# Patient Record
Sex: MALE | Race: ASIAN | NOT HISPANIC OR LATINO | Employment: FULL TIME | URBAN - METROPOLITAN AREA
[De-identification: names, ages, dates, MRNs, and addresses within clinical notes are randomized per-mention and may not be internally consistent; named-entity substitution may affect disease eponyms.]

---

## 2020-08-31 ENCOUNTER — OFFICE VISIT - HEALTHEAST (OUTPATIENT)
Dept: FAMILY MEDICINE | Facility: CLINIC | Age: 35
End: 2020-08-31

## 2020-08-31 DIAGNOSIS — J30.89 SEASONAL ALLERGIC RHINITIS DUE TO OTHER ALLERGIC TRIGGER: ICD-10-CM

## 2020-08-31 DIAGNOSIS — G56.03 BILATERAL CARPAL TUNNEL SYNDROME: ICD-10-CM

## 2020-08-31 ASSESSMENT — MIFFLIN-ST. JEOR: SCORE: 1748.65

## 2020-12-07 ENCOUNTER — OFFICE VISIT - HEALTHEAST (OUTPATIENT)
Dept: FAMILY MEDICINE | Facility: CLINIC | Age: 35
End: 2020-12-07

## 2020-12-07 DIAGNOSIS — M25.531 PAIN IN BOTH WRISTS: ICD-10-CM

## 2020-12-07 DIAGNOSIS — R20.2 PARESTHESIA: ICD-10-CM

## 2020-12-07 DIAGNOSIS — J30.89 SEASONAL ALLERGIC RHINITIS DUE TO OTHER ALLERGIC TRIGGER: ICD-10-CM

## 2020-12-07 DIAGNOSIS — M25.632 WRIST STIFFNESS, LEFT: ICD-10-CM

## 2020-12-07 DIAGNOSIS — M25.532 PAIN IN BOTH WRISTS: ICD-10-CM

## 2020-12-07 DIAGNOSIS — M25.631 WRIST STIFFNESS, RIGHT: ICD-10-CM

## 2020-12-08 ENCOUNTER — OFFICE VISIT - HEALTHEAST (OUTPATIENT)
Dept: FAMILY MEDICINE | Facility: CLINIC | Age: 35
End: 2020-12-08

## 2020-12-08 ENCOUNTER — AMBULATORY - HEALTHEAST (OUTPATIENT)
Dept: LAB | Facility: CLINIC | Age: 35
End: 2020-12-08

## 2020-12-08 DIAGNOSIS — M25.531 PAIN IN BOTH WRISTS: ICD-10-CM

## 2020-12-08 DIAGNOSIS — Z13.1 SCREENING FOR DIABETES MELLITUS: ICD-10-CM

## 2020-12-08 DIAGNOSIS — M25.532 PAIN IN BOTH WRISTS: ICD-10-CM

## 2020-12-08 DIAGNOSIS — Z13.220 LIPID SCREENING: ICD-10-CM

## 2020-12-08 DIAGNOSIS — M25.631 WRIST STIFFNESS, RIGHT: ICD-10-CM

## 2020-12-08 DIAGNOSIS — Z83.3 FAMILY HISTORY OF DIABETES MELLITUS: ICD-10-CM

## 2020-12-08 DIAGNOSIS — M25.632 WRIST STIFFNESS, LEFT: ICD-10-CM

## 2020-12-08 DIAGNOSIS — R20.2 PARESTHESIA: ICD-10-CM

## 2020-12-08 LAB
ANION GAP SERPL CALCULATED.3IONS-SCNC: 14 MMOL/L (ref 5–18)
BUN SERPL-MCNC: 13 MG/DL (ref 8–22)
C REACTIVE PROTEIN LHE: <0.1 MG/DL (ref 0–0.8)
CALCIUM SERPL-MCNC: 9.4 MG/DL (ref 8.5–10.5)
CHLORIDE BLD-SCNC: 104 MMOL/L (ref 98–107)
CHOLEST SERPL-MCNC: 160 MG/DL
CO2 SERPL-SCNC: 23 MMOL/L (ref 22–31)
CREAT SERPL-MCNC: 0.88 MG/DL (ref 0.7–1.3)
ERYTHROCYTE [DISTWIDTH] IN BLOOD BY AUTOMATED COUNT: 10.7 % (ref 11–14.5)
ERYTHROCYTE [SEDIMENTATION RATE] IN BLOOD BY WESTERGREN METHOD: 2 MM/HR (ref 0–15)
FASTING STATUS PATIENT QL REPORTED: NO
GFR SERPL CREATININE-BSD FRML MDRD: >60 ML/MIN/1.73M2
GLUCOSE BLD-MCNC: 101 MG/DL (ref 70–125)
HBA1C MFR BLD: 5 %
HCT VFR BLD AUTO: 47.1 % (ref 40–54)
HDLC SERPL-MCNC: 55 MG/DL
HGB BLD-MCNC: 16.1 G/DL (ref 14–18)
LDLC SERPL CALC-MCNC: 48 MG/DL
MCH RBC QN AUTO: 29.7 PG (ref 27–34)
MCHC RBC AUTO-ENTMCNC: 34.2 G/DL (ref 32–36)
MCV RBC AUTO: 87 FL (ref 80–100)
PLATELET # BLD AUTO: 272 THOU/UL (ref 140–440)
PMV BLD AUTO: 7.3 FL (ref 7–10)
POTASSIUM BLD-SCNC: 4.3 MMOL/L (ref 3.5–5)
RBC # BLD AUTO: 5.42 MILL/UL (ref 4.4–6.2)
SODIUM SERPL-SCNC: 141 MMOL/L (ref 136–145)
TRIGL SERPL-MCNC: 286 MG/DL
TSH SERPL DL<=0.005 MIU/L-ACNC: 2.29 UIU/ML (ref 0.3–5)
WBC: 4.9 THOU/UL (ref 4–11)

## 2020-12-08 ASSESSMENT — PATIENT HEALTH QUESTIONNAIRE - PHQ9: SUM OF ALL RESPONSES TO PHQ QUESTIONS 1-9: 0

## 2020-12-08 ASSESSMENT — MIFFLIN-ST. JEOR: SCORE: 1744.68

## 2020-12-10 ENCOUNTER — COMMUNICATION - HEALTHEAST (OUTPATIENT)
Dept: FAMILY MEDICINE | Facility: CLINIC | Age: 35
End: 2020-12-10

## 2020-12-10 ENCOUNTER — AMBULATORY - HEALTHEAST (OUTPATIENT)
Dept: PEDIATRICS | Facility: CLINIC | Age: 35
End: 2020-12-10

## 2020-12-10 ENCOUNTER — COMMUNICATION - HEALTHEAST (OUTPATIENT)
Dept: PEDIATRICS | Facility: CLINIC | Age: 35
End: 2020-12-10
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
## 2020-12-10 DIAGNOSIS — M25.531 PAIN IN BOTH WRISTS: ICD-10-CM

## 2020-12-10 DIAGNOSIS — M25.532 PAIN IN BOTH WRISTS: ICD-10-CM

## 2020-12-10 LAB — ANA SER QL: 0.4 U

## 2020-12-21 ENCOUNTER — OFFICE VISIT - HEALTHEAST (OUTPATIENT)
Dept: OCCUPATIONAL THERAPY | Facility: REHABILITATION | Age: 35
End: 2020-12-21

## 2020-12-21 DIAGNOSIS — M25.641 STIFFNESS OF FINGER JOINT, RIGHT: ICD-10-CM

## 2020-12-21 DIAGNOSIS — M25.531 PAIN IN BOTH WRISTS: ICD-10-CM

## 2020-12-21 DIAGNOSIS — Z78.9 DECREASED ACTIVITIES OF DAILY LIVING (ADL): ICD-10-CM

## 2020-12-21 DIAGNOSIS — M25.532 PAIN IN BOTH WRISTS: ICD-10-CM

## 2020-12-21 DIAGNOSIS — M25.642 FINGER STIFFNESS, LEFT: ICD-10-CM

## 2021-01-04 ENCOUNTER — OFFICE VISIT - HEALTHEAST (OUTPATIENT)
Dept: OCCUPATIONAL THERAPY | Facility: REHABILITATION | Age: 36
End: 2021-01-04

## 2021-01-04 DIAGNOSIS — M25.531 PAIN IN BOTH WRISTS: ICD-10-CM

## 2021-01-04 DIAGNOSIS — M25.641 STIFFNESS OF FINGER JOINT, RIGHT: ICD-10-CM

## 2021-01-04 DIAGNOSIS — Z78.9 DECREASED ACTIVITIES OF DAILY LIVING (ADL): ICD-10-CM

## 2021-01-04 DIAGNOSIS — M25.532 PAIN IN BOTH WRISTS: ICD-10-CM

## 2021-01-04 DIAGNOSIS — M25.642 FINGER STIFFNESS, LEFT: ICD-10-CM

## 2021-05-26 ASSESSMENT — PATIENT HEALTH QUESTIONNAIRE - PHQ9: SUM OF ALL RESPONSES TO PHQ QUESTIONS 1-9: 0

## 2021-06-04 VITALS
WEIGHT: 171 LBS | BODY MASS INDEX: 23.16 KG/M2 | OXYGEN SATURATION: 98 % | HEART RATE: 74 BPM | HEIGHT: 72 IN | SYSTOLIC BLOOD PRESSURE: 130 MMHG | DIASTOLIC BLOOD PRESSURE: 86 MMHG

## 2021-06-05 VITALS
BODY MASS INDEX: 23.73 KG/M2 | HEART RATE: 69 BPM | OXYGEN SATURATION: 98 % | SYSTOLIC BLOOD PRESSURE: 120 MMHG | WEIGHT: 175 LBS | DIASTOLIC BLOOD PRESSURE: 70 MMHG

## 2021-06-05 VITALS
OXYGEN SATURATION: 98 % | HEIGHT: 72 IN | HEART RATE: 76 BPM | SYSTOLIC BLOOD PRESSURE: 128 MMHG | DIASTOLIC BLOOD PRESSURE: 84 MMHG | TEMPERATURE: 98 F | WEIGHT: 171 LBS | BODY MASS INDEX: 23.16 KG/M2

## 2021-06-11 NOTE — PROGRESS NOTES
Assessment/Plan:        1. Bilateral carpal tunnel syndrome  Exam findings were discussed   Plan:   Wrist bracing for up to 4-6 weeks.   Close follow up if no significant change or improvement as anticipated.        2. Seasonal allergic rhinitis due to other allergic trigger  Refill   - fluticasone propionate (FLONASE) 50 mcg/actuation nasal spray; 2 sprays into each nostril daily.  Dispense: 16 g; Refill: 5        At the conclusion of the encounter the plan of care, disposition and all questions were answered and reviewed, and the patient acknowledged understanding and was involved in the decision making regarding the overall care plan.     Patient Care Team:  Provider, No Primary Care as PCP - General          Subjective:    Patient ID:   Ariella Sheridan is a 34 y.o. male presenting with intermittent numbness of the hands which progressively worsens throughout the day and better in the morning, on going for the past few months. He notes to having pain on the radial side of the wrist by bending the wrist and his hand/ fingers feel tight on making a fist. He notes that the right hand seems to be worse than the left and stretching or laying the hand flat on the palm side makes it feel better.        He also needs a refill of his nasal spray used for the management of seasonal allergies.     Review of Systems  Allergy: reviewed  General : negative  A complete 5 point review of systems was obtained and is negative other than what is stated in the HPI.      The following patient's history were reviewed and updated as appropriate:   He  has no past medical history on file..      Outpatient Encounter Medications as of 8/31/2020   Medication Sig Dispense Refill     fluticasone propionate (FLONASE) 50 mcg/actuation nasal spray 1-2 sprays.       No facility-administered encounter medications on file as of 8/31/2020.          Objective:   /86 (Patient Site: Right Arm, Patient Position: Sitting, Cuff Size: Adult Regular)    Pulse 74   Ht 6' (1.829 m)   Wt 171 lb (77.6 kg)   SpO2 98%   BMI 23.19 kg/m        Physical Exam  Gen: NAD, well hydrated   Hands/wrists: normal to inspection no swelling or erythema, subjective numbness of the hands, normal strength. Normal muscle bulk and tone.

## 2021-06-13 NOTE — TELEPHONE ENCOUNTER
----- Message from Nicci Beckett DO sent at 12/10/2020 12:23 PM CST -----  Please call patient and let him know. Diabetes screen negative. His lipid profile showed slightly elevated triglycerides ( fatty cells in body) - doesn't require meds at this time. Most likely diet related. Would increase fruits/veggies and cut out fatty food. Will recheck next year.

## 2021-06-13 NOTE — PROGRESS NOTES
Progress Note     ASSESSMENT/PLAN:    Pain in both wrists  Wrist stiffness, right  Wrist stiffness, left  Paresthesia  Given symmetric nature of the wrist pain, non-median nerve distribution of paresthesias, no improvement with wrist brace, improvement with NSAIDs,  (+) FMHX of rheumatoid dz and reproducible bony tenderness - will get baseline work up for inflammatory condition. Will also get wrist Xrays due to worsening pain and duration of pain to monitor for body destruction or underlying fractures that may have been missed. If no obvious cause noted on bloodwork or imaging, may refer to PT or consider EMG. Will also initiate RICE therapy to help with sxs - Naprosyn 500mg two times a day x 7 days.   - Antinuclear Antibodies Screen (ALEXEI)  - C-Reactive Protein (CRP)  - Sedimentation Rate  - Basic Metabolic Panel  - HM2(CBC w/o Differential)  - Thyroid Stimulating Hormone (TSH)  - XR Wrist Left 3 or More VWS; Future  - XR Wrist Right 3 or More VWS; Future  - XR Wrist Left 3 or More VWS  - XR Wrist Right 3 or More VWS    Seasonal allergic rhinitis due to other allergic trigger  Refill requested   - fluticasone propionate (FLONASE) 50 mcg/actuation nasal spray; 2 sprays into each nostril daily.      Medications Discontinued During This Encounter   Medication Reason     fluticasone propionate (FLONASE) 50 mcg/actuation nasal spray Reorder       RTO: has an appt tomorrow for annual      CHIEF COMPLAINT:  Chief Complaint   Patient presents with     Follow-up     wrist pain on both hands       HISTORY OF PRESENT ILLNESS:  Ariella Sheridan is a 35 y.o. male w/ no significant PMH who presents for breast pain follow-up.    Was seen by physician at this clinic on 8/31/2020 and diagnosed with bilateral carpal tunnel.  Was told to wear wrist brace for 4 to 6 weeks and come back for follow-up.     Patient has been consistently wearing the wrist brace throughout the day and sometimes at night.  He does not feel that his symptoms have  "improved much.  He is still getting bilateral wrist pain, on the dorsal aspect near the radial styloid process.  When he wakes up in the morning, he notes significant stiffness of his hands.  It takes \"hours for [ his] hands to warm up\".  Patient works as an  and reports that his wrist pain and stiffness get better with activity.  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  He has no pain anywhere else no back pain.  Does have family history of arthritis, his mom has significant arthritis.  This pain in his wrists started several years ago but recently has gotten \"much worse\" over the last few months.  He does not remember any triggering/inciting event.  No history of trauma.  Uses ibuprofen which does help the pain has not tried icing the area.  No fevers or chills associated with the wrist pain.        REVIEW OF SYSTEMS:   All other systems are negative.      TOBACCO USE:  Social History     Tobacco Use   Smoking Status Never Smoker   Smokeless Tobacco Never Used       MEDICATIONS:  Current Outpatient Medications   Medication Sig Dispense Refill     fluticasone propionate (FLONASE) 50 mcg/actuation nasal spray 2 sprays into each nostril daily. 16 g 5     No current facility-administered medications for this visit.          Immunization History   Administered Date(s) Administered     Influenza [id number], 11/25/2009     Influenza, Seasonal, Inj PF IIV3 11/25/2009         VITALS:  Vitals:    12/07/20 0927   BP: 120/70   Patient Site: Right Arm   Patient Position: Sitting   Pulse: 69   SpO2: 98%   Weight: 175 lb (79.4 kg)     Wt Readings from Last 3 Encounters:   12/07/20 175 lb (79.4 kg)   08/31/20 171 lb (77.6 kg)     Body mass index is 23.73 kg/m .      PHYSICAL EXAM:  Constitutional:  Reveals a pleasant male, NAD.  Vitals:  Per nursing notes.  Cardiac:  Regular rate and rhythm without murmurs, rubs, or gallops.Legs without edema. Palpation of the radial pulse regular.  Lungs: " Clear.  Respiratory effort normal.   MSK/skin/Rheum:  On inspection alone, patient has no obvious redness or swelling on the wrists bilaterally.  Patient has negative Phalen and Tinel's sign bilaterally.  Negative Finkelstein's.  No evidence of hypothenar eminence atrophy bilaterally.  Sensation intact in all fingertips bilaterally.   strength intact bilaterally.  No reproduction of symptoms with resisted supination pronation, resisted extension and flexion.  Patient has no fluctuance or areas of drainage or signs of infection bilaterally.  Patient has no snuffbox tenderness bilaterally.  Patient had reproducible bony tenderness of the medial aspect of the wrist bilaterally, near the radial styloid styloid.  No swelling or pain with palpation of the olecranon bilaterally.  Psychiatric:  Mood appropriate, memory intact.

## 2021-06-13 NOTE — TELEPHONE ENCOUNTER
----- Message from Nicci Beckett DO sent at 12/10/2020 12:21 PM CST -----  Please call the patient and let him know that all of his bloodwork came back essentially normal. No evidence of arthritis of inflammatory condition. The Xrays of his wrist looked normal. The wrist pain may be due to the repetitive wrist movement that he does at work. I have put in referral to PT to help with your pain. They should give you a call in the next 1-2 weeks. If you haven't herd from them, please let me know

## 2021-06-14 NOTE — PROGRESS NOTES
Discharge Summary  Patient Name: Ariella Sheridan  Date: 2/19/2021  Referral Diagnosis: pain in both wrists  Referring provider: Nicci Beckett*  Visit Diagnosis:   1. Pain in both wrists     2. Finger stiffness, left     3. Stiffness of finger joint, right     4. Decreased activities of daily living (ADL)         Goal status: not met  Patient Will Demonstrate / Verbalize independence in self-management of condition in: 4 weeks  Patient will be independent with home exercise program in: 4 weeks  Patient will be able to: lift;carry;reach;for work;with no pain;in 12 weeks  Patient will perform: meal preparation;with no pain;in 12 weeks  Patient will be able to  & pinch: for household chores;with no pain;in 12 weeks  Patient will improve hand/finger coordination for: writing;fasteners;typing;with no pain;in 12 weeks      Patient was seen for 2 visits between 12-21-20 and 1-4-21.    Therapy will be discontinued at this time.  The patient will need a new referral to resume.    Thank you for your referral.  Brianne Bernabe  2/19/2021   12:11 PM    Occupational Therapy Daily Progress     Patient Name: Ariella Sheridan  Date: 1/4/2021  Visit #: 2  Referral Diagnosis: pain in both wrists  Referring provider: Nicci Beckett*  Visit Diagnosis:     ICD-10-CM    1. Pain in both wrists  M25.531     M25.532    2. Finger stiffness, left  M25.642    3. Stiffness of finger joint, right  M25.641    4. Decreased activities of daily living (ADL)  Z78.9        Assessment:     Patient is appropriate to continue with skilled occupational therapy intervention, as indicated by initial plan of care.    Goal Status:  Patient Will Demonstrate / Verbalize independence in self-management of condition in: 4 weeks  Patient will be independent with home exercise program in: 4 weeks  Patient will be able to: lift;carry;reach;for work;with no pain;in 12 weeks  Patient will perform: meal preparation;with no pain;in 12 weeks  Patient will  be able to  & pinch: for household chores;with no pain;in 12 weeks  Patient will improve hand/finger coordination for: writing;fasteners;typing;with no pain;in 12 weeks      Plan / Patient Education:     See if numbness in fingertips has improved. Re-measure  strength. Consider brachial plexus glides.    Subjective:     Pain rating at rest: 3  Pain rating with activity: 5      Objective:     Treatment Today: Assessed fit of OTC wrist braces. Patient has large, bulky OTC thumb spica braces. His pain in on the dorsal wrists and the thumbs are not involved. Modified braces by removing all but the volar stay. Patient will trim off the thumb piece as well to convert to a wrist cock up brace and try the right brace only for one week to see if pain decreases.  Patient re-instructed on stretch to bilateral wrist extensors and will add slight ulnar and radial deviation during stretch. Performed muscle stripping to bilateral wrist extensors. Performed and instructed on AP mobilizations over ulnar wrists. Patient reports that his biggest concern in the numbness in finger tips of all fingers. Questioning if it might be coming from his neck. Patient may benefit from referral to PT and orthopedic specialist to further evaluate as patient reports no change in symptoms.   TREATMENT MINUTES COMMENTS   Evaluation     Self-care/ Home management     Manual therapy 10    Neuromuscular Re-education     Therapeutic Exercises 5    Iontophoresis     Orthotic Fitting 10    Total 25    Blank areas are intentional and mean the treatment did not include these items.       Brianne Bernabe  1/4/2021  7:31 AM

## 2021-06-20 ENCOUNTER — HEALTH MAINTENANCE LETTER (OUTPATIENT)
Age: 36
End: 2021-06-20

## 2021-06-30 NOTE — PROGRESS NOTES
Progress Notes by Brianne Bernabe OT at 12/21/2020  7:00 AM     Author: Brianne Bernabe OT Service: -- Author Type: Occupational Therapist    Filed: 12/21/2020  8:00 AM Encounter Date: 12/21/2020 Status: Attested    : Brianne Bernabe OT (Occupational Therapist) Cosigner: Nicci Beckett DO at 12/21/2020  9:28 AM    Attestation signed by Nicci Beckett DO at 12/21/2020  9:28 AM    Note reviewed, agree with plan                     Certification Request    December 21, 2020      Patient: Ariella Sheridan  MR Number: [id number]  YOB: 1985  Date of Visit: 12/21/2020      Dear Dr. Nicci Beckett:    Thank you for this referral.   We are seeing Ariella Sheridan in Occupational Therapy for bilateral hand pain.    Medicare and/or Medicaid requires physician review and approval of the treatment plan. Please review the plan of care and verify that you agree with the therapy plan of care by co-signing this note.      Plan of Care  Authorization / Certification Start Date: 12/21/20  Authorization / Certification End Date: 03/21/21  Authorization / Certification Number of Visits: 12  Communication with: Referral Source  Patient Related Instruction: Nature of Condition;Treatment plan and rationale;Self Care instruction;Basis of treatment;Expected outcome;Body mechanics  Times per Week: 1  Number of Weeks: 12  Number of Visits: 12  Select Plan of Care: Select  Therapeutic Exercise: Stretching;Strengthening  Neuromuscular Reeducation: kinesio tape  Manual Therapy: soft tissue mobilization  Functional Training (ADL's): ADL's;ergonomics  Orthotic Fitting: OTC;custom      Goals:  Patient Will Demonstrate / Verbalize independence in self-management of condition in: 4 weeks  Patient will be independent with home exercise program in: 4 weeks  Patient will be able to: lift;carry;reach;for work;with no pain;in 12 weeks  Patient will perform: meal preparation;with no pain;in 12  weeks  Patient will be able to  & pinch: for household chores;with no pain;in 12 weeks  Patient will improve hand/finger coordination for: writing;fasteners;typing;with no pain;in 12 weeks        If you have any questions or concerns, please don't hesitate to call.    Sincerely,      Brianne Bernabe, OT        Physician recommendation:                                ___ Follow therapist's recommendation                                                                                                    ___ Modify therapy                                                                                                      Physician Signature:_____________________                                                                                                                                        Date:___________________________      *Physician co-signature indicates they certify the need for these services furnished within this plan and while under their care.        Upper Extremity Initial Evaluation    Patient Name: Ariella Sheridan  Date of evaluation: 12/21/2020  Referral Diagnosis: Pain in both wrists  Referring provider: Nicci Beckett*  Visit Diagnosis:     ICD-10-CM    1. Pain in both wrists  M25.531     M25.532    2. Finger stiffness, left  M25.642    3. Stiffness of finger joint, right  M25.641    4. Decreased activities of daily living (ADL)  Z78.9        Assessment:      Pt. is appropriate for skilled OT intervention as outlined in the Plan of Care (POC).    Goals:  Patient Will Demonstrate / Verbalize independence in self-management of condition in: 4 weeks  Patient will be independent with home exercise program in: 4 weeks  Patient will be able to: lift;carry;reach;for work;with no pain;in 12 weeks  Patient will perform: meal preparation;with no pain;in 12 weeks  Patient will be able to  & pinch: for household chores;with no pain;in 12 weeks  Patient will improve hand/finger coordination for:  writing;fasteners;typing;with no pain;in 12 weeks      Patient's expectations/goals are realistic.    Barriers to Learning or Achieving Goals:  No Barriers.       Plan / Patient Instructions:        Plan of Care:   Authorization / Certification Start Date: 12/21/20  Authorization / Certification End Date: 03/21/21  Authorization / Certification Number of Visits: 12  Communication with: Referral Source  Patient Related Instruction: Nature of Condition;Treatment plan and rationale;Self Care instruction;Basis of treatment;Expected outcome;Body mechanics  Times per Week: 1  Number of Weeks: 12  Number of Visits: 12  Select Plan of Care: Select  Therapeutic Exercise: Stretching;Strengthening  Neuromuscular Reeducation: kinesio tape  Manual Therapy: soft tissue mobilization  Functional Training (ADL's): ADL's;ergonomics  Orthotic Fitting: OTC;custom      POC and pathology of condition were reviewed with patient.  Pt. is in agreement with the Plan of Care. Treatment techniques, plan of care, and goals were discussed with the patient.  The patient agrees to the plan as outlined.  The plan of care is dynamic and will be modified on an ongoing basis.    A Home Exercise Program (HEP) was initiated today. Pt. was instructed in exercises by OT and patient was given a handout with detailed instructions.        Subjective:        Social information:   Occupation:    Work Status:Working full time     History of Present Illness:    Ariella is a 35 y.o. male who presents to therapy today with complaints of bilateral wrist pain and finger numbness. Date of onset/duration of symptoms is March 2020. He has had symptoms over the last 3 years that would come and go, especially in the morning. Onset was gradual. Symptoms are getting worse.  He describes their previous level of function as not limited. Functional limitations are described as occurring with gripping, pinching, lifting, carrying for ADL's and IADL's.     Pain rating at  rest: 0  Pain rating with activity: 9         Objective:      Note: Items left blank indicates the item was not performed or not indicated at the time of the evaluation.    Patient Outcome Measures :    No data recorded    Upper Extremity Examination:  1. Pain in both wrists     2. Finger stiffness, left     3. Stiffness of finger joint, right     4. Decreased activities of daily living (ADL)       Precautions/Restrictions: None  Involved side: Bilateral  Atrophy:  Absent  Color: Normal  Temperature: Normal  Edema: Absent  Palpation: dorsal wrist   Scar: NA  Guarded extremity: Minimal.  Sensory: Patient reports numbness in all fingers      Hand AROM  Right   Left     NT WNL Impaired NT WNL Impaired   Full Fist  x   x    Flat Fist  x   x    Claw Fist  x   x      ROM/STRENGTH RIGHT LEFT   Note: * indicates pain AROM AROM   Shoulder Flexion - 180? WNL WNL   Shoulder Ext - 60?      Shoulder Abd - 180?     Elbow Flexion - 150? WNL WNL   Elbow Extension - 0?    WNL WNL   Forearm Supination - 80? WNL WNL   Forearm Pronation - 80? WNL WNL   Wrist Flexion - 65-80?  WNL WNL   Wrist Extension - 65-80? WNL WNL   Ulnar Deviation - 20-35?     Radial Deviation - 10-20?      Strength 121# 120#   3 Point Pinch 21# 21#   Lateral Pinch       May benefit from PT evaluation: No    U/E orthosis currently: has bilateral wrist brace and wears at night    Plan for next visit:  [facility], assess fit of OTC wrist braces    Treatment Today: Patient instructed on ergonomic and activity modification for wrist pain including avoiding small and repetitive gripping, maintaining wrist neutral when able etc. Patient instructed on stretch to bilateral wrist extensors. Performed and instructed on muscle stripping to bilateral wrist extensors. Instructed on and applied kinesiotape from bilateral dorsal hands to elbow.   TREATMENT MINUTES COMMENTS   Evaluation 15    Self-care/ Home management 3 ergonomics   Manual therapy 8 STM   Neuromuscular Re-education  2 tape   Orthotic fitting     Therapeutic Exercises 3 stretch   Iontophoresis           Total 31    Blank areas are intentional and mean the treatment did not include these items.     GOALS AND PLAN OF CARE WERE ESTABLISHED IN COOPERATION WITH THE PATIENT    OT Evaluation Code: (Please list factors)   Comorbidities:   Patient Active Problem List   Diagnosis   ? Wrist pain, acute     Profile/History Review: Brief    Need for eval modification: No    # Treatment options: Limited    Clinical Decision Making:  Low      Occupational Profile/ Medical and Therapy History and Comorbidities Occupational Performance Clinical Decision Making   (Complexity)   brief history with review of medical/therapy records related to the presenting problem.  No comorbidities 1-3 Performance deficits that result in activity limitations and/or participation restrictions.    No Assessment Modification  Low complexity, which includes  problem-focused assessments, and consideration of a limited number of treatment options.      expanded review of medical/therapy records and additional review of physical, cognitive and psychosocial history.    May have comorbidities 3-5 Performance deficits that result in activity limitations and/or participation restrictions.    Minimal to moderate modification of assessment Moderate complexity, which includes analysis of data from detailed assessments, and consideration of several treatment options.         Review of medical/therapy records and extensive additional review of physical, cognitive and psychosocial history.  Comorbidities affect occupational performance 5 or more Performance deficits that result in activity limitations and/or participation restrictions.    Significant modification of assessment High complexity, analysis of  Occupational profile and data,  Comprehensive assessments, multiple treatment options.            Brianne Bernabe  12/21/2020  7:05 AM

## 2021-06-30 NOTE — PROGRESS NOTES
Progress Notes by Nicci Beckett DO at 12/8/2020 12:00 PM     Author: Nicci Beckett DO Service: -- Author Type: Physician    Filed: 12/8/2020 12:44 PM Encounter Date: 12/8/2020 Status: Signed    : Nicci Beckett DO (Physician)       MALE PREVENTATIVE EXAM    Assessment and Plan:   Patient has been advised of split billing requirements and indicates understanding: Yes    Problem List Items Addressed This Visit     None      Visit Diagnoses     Lipid screening    -  Primary    Relevant Orders    Lipid Profile    Screening for diabetes mellitus        Relevant Orders    Glycosylated Hemoglobin A1c    Family history of diabetes mellitus        Relevant Orders    Glycosylated Hemoglobin A1c        PHQ-9 Total Score: 0 (12/8/2020 12:00 PM)  - FMI 23.34 - lives active lifestyle and works out frequent.   - given  american descent, strong FMHx for DM II - will get Hba1C despite normal BMI   - will screen for lipids   - decline flu and Tdap today   - patient enjoys hunting and has firearms in the home. Discussed safe storage. Guns in a safe at home     Next follow up:  Return in about 1 month (around 1/8/2021) for Wrist pain follow up .    Immunization Review  Adult Imm Review: Declines immunizations today  no tobacco abuse, no counseling provided    I discussed the following with the patient:   Adult Healthy Living: Importance of regular exercise  Healthy nutrition  Stress management  Firearm safety    I have had an Advance Directives discussion with the patient. Honoring choices paperwork provided     Subjective:   Chief Complaint: Ariella Sheridan is an 35 y.o. male here for a preventative health visit.    Patient has been advised of split billing requirements and indicates understanding: Yes.    HPI:      No new concerns today     Home liver: has 3 kids, ages 6, 4 ,2 and fourth kid on the way. Wife of 10 years, they met at [facility]. She is a stay at home mom  Occupation:  "automechanic   Tobacco - never   Alcohol: socially, every other   Marijuana - never   Vaping: no   Illicit drug: none   Hobbies: hunting         Healthy Habits  Are you taking a daily aspirin? No  Do you typically exercising at least 40 min, 3-4 times per week?  Yes  Do you usually eat at least 4 servings of fruit and vegetables a day, include whole grains and fiber and avoid regularly eating high fat foods? Yes  Have you had an eye exam in the past two years? NO, got lasik eye surgery about 3 years ago. NO complications .   Do you see a dentist twice per year? Yes  Do you have any concerns regarding sleep? No    Safety Screen  If you own firearms, are they secured in a locked gun cabinet or with trigger locks? Yes, kids know about  It   Do you feel you are safe where you are living?: Yes (12/8/2020 11:57 AM)  Do you feel you are safe in your relationship(s)?: Yes (12/8/2020 11:57 AM)      Review of Systems:  Please see above.  The rest of the review of systems are negative for all systems.     Cancer Screening     Patient has no health maintenance due at this time          Patient Care Team:  Nicci Beckett DO as PCP - General (Family Medicine)  Jose Rincon MD as Assigned PCP        History     Reviewed By Date/Time Sections Reviewed    Jade King CMA 12/8/2020 11:59 AM Tobacco            Objective:   Vital Signs:   Visit Vitals  /84   Pulse 76   Temp 98  F (36.7  C) (Oral)   Ht 5' 11.75\" (1.822 m)   Wt 171 lb (77.6 kg)   SpO2 98%   BMI 23.35 kg/m           PHYSICAL EXAM  Physical Exam  Vitals signs reviewed.   Constitutional:       General: He is not in acute distress.     Appearance: Normal appearance. He is not ill-appearing, toxic-appearing or diaphoretic.   HENT:      Head: Normocephalic and atraumatic.      Right Ear: Tympanic membrane and ear canal normal. There is impacted cerumen.      Left Ear: Tympanic membrane and ear canal normal. There is impacted cerumen.      Nose: " Nose normal. No congestion or rhinorrhea.      Mouth/Throat:      Mouth: Mucous membranes are moist.      Pharynx: Oropharynx is clear. No oropharyngeal exudate.   Eyes:      General: No scleral icterus.        Right eye: No discharge.         Left eye: No discharge.      Extraocular Movements: Extraocular movements intact.      Conjunctiva/sclera: Conjunctivae normal.   Neck:      Musculoskeletal: Normal range of motion and neck supple.   Cardiovascular:      Rate and Rhythm: Normal rate and regular rhythm.      Pulses: Normal pulses.      Heart sounds: Normal heart sounds. No murmur. No friction rub. No gallop.    Pulmonary:      Effort: Pulmonary effort is normal. No respiratory distress.      Breath sounds: Normal breath sounds. No stridor. No wheezing, rhonchi or rales.   Chest:      Chest wall: No tenderness.   Abdominal:      General: Abdomen is flat. There is no distension.      Palpations: Abdomen is soft. There is no mass.      Tenderness: There is no abdominal tenderness. There is no right CVA tenderness, left CVA tenderness, guarding or rebound.      Hernia: No hernia is present.   Musculoskeletal: Normal range of motion.         General: No swelling, tenderness, deformity or signs of injury.      Right lower leg: No edema.      Left lower leg: No edema.   Skin:     General: Skin is warm and dry.      Coloration: Skin is not jaundiced or pale.      Findings: No bruising, erythema, lesion or rash.   Neurological:      General: No focal deficit present.      Mental Status: He is alert and oriented to person, place, and time.      Motor: No weakness.      Coordination: Coordination normal.      Gait: Gait normal.   Psychiatric:         Mood and Affect: Mood normal.         Behavior: Behavior normal.              Medication List          Accurate as of December 8, 2020 12:43 PM. If you have any questions, ask your nurse or doctor.            CONTINUE taking these medications    fluticasone propionate 50  mcg/actuation nasal spray  Also known as: FLONASE  INSTRUCTIONS: 2 sprays into each nostril daily.        naproxen 500 MG tablet  Also known as: Naprosyn  INSTRUCTIONS: Take 1 tablet (500 mg total) by mouth 2 (two) times a day with meals for 7 days.               Additional Screenings Completed Today:

## 2021-10-11 ENCOUNTER — HEALTH MAINTENANCE LETTER (OUTPATIENT)
Age: 36
End: 2021-10-11

## 2022-01-30 ENCOUNTER — HEALTH MAINTENANCE LETTER (OUTPATIENT)
Age: 37
End: 2022-01-30

## 2022-09-24 ENCOUNTER — HEALTH MAINTENANCE LETTER (OUTPATIENT)
Age: 37
End: 2022-09-24

## 2022-12-30 ENCOUNTER — APPOINTMENT (OUTPATIENT)
Dept: CT IMAGING | Facility: HOSPITAL | Age: 37
End: 2022-12-30
Attending: EMERGENCY MEDICINE
Payer: COMMERCIAL

## 2022-12-30 ENCOUNTER — APPOINTMENT (OUTPATIENT)
Dept: RADIOLOGY | Facility: HOSPITAL | Age: 37
End: 2022-12-30
Attending: EMERGENCY MEDICINE
Payer: COMMERCIAL

## 2022-12-30 ENCOUNTER — HOSPITAL ENCOUNTER (EMERGENCY)
Facility: HOSPITAL | Age: 37
Discharge: HOME | End: 2022-12-30
Admitting: PHYSICIAN ASSISTANT
Payer: COMMERCIAL

## 2022-12-30 VITALS
HEART RATE: 72 BPM | SYSTOLIC BLOOD PRESSURE: 148 MMHG | RESPIRATION RATE: 16 BRPM | TEMPERATURE: 98.7 F | BODY MASS INDEX: 23.35 KG/M2 | DIASTOLIC BLOOD PRESSURE: 88 MMHG | WEIGHT: 171 LBS | OXYGEN SATURATION: 99 %

## 2022-12-30 DIAGNOSIS — A08.4 VIRAL GASTROENTERITIS: ICD-10-CM

## 2022-12-30 DIAGNOSIS — R10.30 LOWER ABDOMINAL PAIN, UNSPECIFIED: ICD-10-CM

## 2022-12-30 LAB
ALBUMIN SERPL BCG-MCNC: 4.1 G/DL (ref 3.5–5.2)
ALBUMIN UR-MCNC: NEGATIVE MG/DL
ALP SERPL-CCNC: 70 U/L (ref 40–129)
ALT SERPL W P-5'-P-CCNC: 32 U/L (ref 10–50)
ANION GAP SERPL CALCULATED.3IONS-SCNC: 9 MMOL/L (ref 7–15)
APPEARANCE UR: CLEAR
AST SERPL W P-5'-P-CCNC: 25 U/L (ref 10–50)
BILIRUB DIRECT SERPL-MCNC: <0.2 MG/DL (ref 0–0.3)
BILIRUB SERPL-MCNC: 1.1 MG/DL
BILIRUB UR QL STRIP: NEGATIVE
BUN SERPL-MCNC: 9 MG/DL (ref 6–20)
CALCIUM SERPL-MCNC: 8.4 MG/DL (ref 8.6–10)
CHLORIDE SERPL-SCNC: 107 MMOL/L (ref 98–107)
COLOR UR AUTO: COLORLESS
CREAT SERPL-MCNC: 0.86 MG/DL (ref 0.67–1.17)
DEPRECATED HCO3 PLAS-SCNC: 25 MMOL/L (ref 22–29)
ERYTHROCYTE [DISTWIDTH] IN BLOOD BY AUTOMATED COUNT: 12 % (ref 10–15)
GFR SERPL CREATININE-BSD FRML MDRD: >90 ML/MIN/1.73M2
GLUCOSE SERPL-MCNC: 107 MG/DL (ref 70–99)
GLUCOSE UR STRIP-MCNC: NEGATIVE MG/DL
HCT VFR BLD AUTO: 44.8 % (ref 40–53)
HGB BLD-MCNC: 15.3 G/DL (ref 13.3–17.7)
HGB UR QL STRIP: NEGATIVE
KETONES UR STRIP-MCNC: NEGATIVE MG/DL
LEUKOCYTE ESTERASE UR QL STRIP: NEGATIVE
LIPASE SERPL-CCNC: 45 U/L (ref 13–60)
MCH RBC QN AUTO: 29.4 PG (ref 26.5–33)
MCHC RBC AUTO-ENTMCNC: 34.2 G/DL (ref 31.5–36.5)
MCV RBC AUTO: 86 FL (ref 78–100)
NITRATE UR QL: NEGATIVE
PH UR STRIP: 6.5 [PH] (ref 5–7)
PLATELET # BLD AUTO: 224 10E3/UL (ref 150–450)
POTASSIUM SERPL-SCNC: 4 MMOL/L (ref 3.4–5.3)
PROT SERPL-MCNC: 6.7 G/DL (ref 6.4–8.3)
RBC # BLD AUTO: 5.2 10E6/UL (ref 4.4–5.9)
RBC URINE: 1 /HPF
SODIUM SERPL-SCNC: 141 MMOL/L (ref 136–145)
SP GR UR STRIP: 1.01 (ref 1–1.03)
TROPONIN T SERPL HS-MCNC: 13 NG/L
UROBILINOGEN UR STRIP-MCNC: <2 MG/DL
WBC # BLD AUTO: 4.7 10E3/UL (ref 4–11)
WBC URINE: <1 /HPF

## 2022-12-30 PROCEDURE — 82248 BILIRUBIN DIRECT: CPT | Performed by: EMERGENCY MEDICINE

## 2022-12-30 PROCEDURE — 93005 ELECTROCARDIOGRAM TRACING: CPT | Performed by: EMERGENCY MEDICINE

## 2022-12-30 PROCEDURE — 84484 ASSAY OF TROPONIN QUANT: CPT | Performed by: EMERGENCY MEDICINE

## 2022-12-30 PROCEDURE — 85027 COMPLETE CBC AUTOMATED: CPT | Performed by: EMERGENCY MEDICINE

## 2022-12-30 PROCEDURE — 83690 ASSAY OF LIPASE: CPT | Performed by: EMERGENCY MEDICINE

## 2022-12-30 PROCEDURE — 250N000011 HC RX IP 250 OP 636: Performed by: EMERGENCY MEDICINE

## 2022-12-30 PROCEDURE — 99285 EMERGENCY DEPT VISIT HI MDM: CPT | Mod: 25

## 2022-12-30 PROCEDURE — 71046 X-RAY EXAM CHEST 2 VIEWS: CPT

## 2022-12-30 PROCEDURE — 36415 COLL VENOUS BLD VENIPUNCTURE: CPT | Performed by: EMERGENCY MEDICINE

## 2022-12-30 PROCEDURE — 74177 CT ABD & PELVIS W/CONTRAST: CPT

## 2022-12-30 PROCEDURE — 82310 ASSAY OF CALCIUM: CPT | Performed by: EMERGENCY MEDICINE

## 2022-12-30 PROCEDURE — 80053 COMPREHEN METABOLIC PANEL: CPT | Performed by: EMERGENCY MEDICINE

## 2022-12-30 PROCEDURE — 81001 URINALYSIS AUTO W/SCOPE: CPT | Performed by: EMERGENCY MEDICINE

## 2022-12-30 RX ORDER — IOPAMIDOL 755 MG/ML
100 INJECTION, SOLUTION INTRAVASCULAR ONCE
Status: COMPLETED | OUTPATIENT
Start: 2022-12-30 | End: 2022-12-30

## 2022-12-30 RX ORDER — ONDANSETRON 4 MG/1
4 TABLET, ORALLY DISINTEGRATING ORAL EVERY 8 HOURS PRN
Qty: 10 TABLET | Refills: 0 | Status: SHIPPED | OUTPATIENT
Start: 2022-12-30 | End: 2023-01-02

## 2022-12-30 RX ORDER — ONDANSETRON 2 MG/ML
4 INJECTION INTRAMUSCULAR; INTRAVENOUS ONCE
Status: DISCONTINUED | OUTPATIENT
Start: 2022-12-30 | End: 2022-12-30 | Stop reason: HOSPADM

## 2022-12-30 RX ADMIN — IOPAMIDOL 100 ML: 755 INJECTION, SOLUTION INTRAVENOUS at 18:03

## 2022-12-30 ASSESSMENT — ENCOUNTER SYMPTOMS
SHORTNESS OF BREATH: 1
WEAKNESS: 1
HEMATURIA: 0
COUGH: 0
VOMITING: 0
LIGHT-HEADEDNESS: 0
FEVER: 0
CHEST TIGHTNESS: 0
CHILLS: 0
NAUSEA: 1
ABDOMINAL DISTENTION: 0
DIZZINESS: 0
FREQUENCY: 0
ABDOMINAL PAIN: 1
NUMBNESS: 0
HEADACHES: 0
DIARRHEA: 1
FATIGUE: 0
DYSURIA: 0

## 2022-12-30 ASSESSMENT — ACTIVITIES OF DAILY LIVING (ADL): ADLS_ACUITY_SCORE: 35

## 2022-12-30 NOTE — ED NOTES
ED Provider In Triage Note  [facility]  Encounter Date: Dec 30, 2022    Chief Complaint   Patient presents with     Abdominal Pain       Brief HPI:   Ariella Sheridan is a 37 year old male presenting to the Emergency Department with a chief complaint of chest tightness and intermittent SOB since Wednesday. He was working on snow[employer] for ~6-7 hours - went inside and felt very fatigued with nausea and mid-abdominal pain. Has had diarrhea since Wednesday.     Brief Physical Exam:  There were no vitals taken for this visit.  General: Non-toxic appearing  HEENT: Atraumatic  Resp: No respiratory distress; clear lungs  Cardiac: RRR  Abdomen: soft, mild tenderness to palpation mid abdomen to lower abdomen  Neuro: Alert, oriented, answers questions appropriately; cranial nerves grossly intact, no focal motor deficits  Psych: Behavior appropriate      Plan Initiated in Triage:  Orders Placed This Encounter     Chest XR,  PA & LAT     CT Abdomen Pelvis w Contrast     CBC (+ platelets, no diff)     Basic metabolic panel     Hepatic function panel     Lipase     UA with Microscopic reflex to Culture     Troponin T, High Sensitivity (now)     Consider gastroenteritis, SBO, appendicitis, ureteral stone    Do not suspect CO poisoning    PIT Dispo:   ED Eval    Tita Dhillon MD on 12/30/2022 at 4:00 PM    Patient was evaluated by the Physician in Triage due to a limitation of available rooms in the Emergency Department. A plan of care was discussed based on the information obtained on the initial evaluation and patient was consuled to return back to the Emergency Department lobby after this initial evalutaiton until results were obtained or a room became available in the Emergency Department. Patient was counseled not to leave prior to receiving the results of their workup.        Tita Dhillon MD  12/30/22 5138

## 2022-12-30 NOTE — ED PROVIDER NOTES
EMERGENCY DEPARTMENT ENCOUNTER      NAME: Ariella Sheridan  AGE: 37 year old male  YOB: 1985  MRN: [id number]  EVALUATION DATE & TIME: 12/30/2022  5:10 PM    PCP: Nicci Beckett    ED PROVIDER: Milind Alva PA-C    Chief Complaint   Patient presents with     Abdominal Pain     FINAL IMPRESSION:  1. Lower abdominal pain, unspecified    2. Viral gastroenteritis      ED COURSE & MEDICAL DECISION MAKING:    Pertinent Labs & Imaging studies reviewed. (See chart for details)  5:47 PM I met the patient and performed my initial interview and exam.   7:05 PM Patient seen and updated, plan for discharge.     37 year old male presents to the Emergency Department for evaluation of LLQ abdominal pain, fatigue, diarrhea, nausea.     ED Course as of 12/30/22 1907   Fri Dec 30, 2022   1757 Patient is a 37-year-old male, presents emergency department for evaluation of shortness of breath, fatigue, mid abdominal pain, left lower quadrant abdominal pain, diarrhea since Wednesday.  Patient notes that he was working on a [employer] on Wednesday, was outside during this, the motor was running, however he was working on this for 6 or 7 hours, he notes that when he came inside he was very fatigued, and somewhat nauseous.  Does note some mild shortness of breath.  On examination here, he is primarily complaining of left lower quadrant abdominal pain.  He is not tachycardic or tachypneic, is not dyspneic.  His vitals have been stable here.  Laboratory studies were done prior to my initial evaluation, negative urinalysis, lipase, hepatic function, BMP, troponin, CBC.  EKG here shows sinus rhythm, no ST or T wave abnormalities.  Pending imaging at this time including CT abdomen pelvis, as well as x-ray of the chest.  Differential at this point includes gastroenteritis, possible small bowel obstruction, possible appendicitis though less likely given the distribution of the pain, possible stone.  I would like to be carbon  monoxide poisoning given that the patient was outside, and symptoms primarily seem to be abdominal in origin.  No fevers.  No blood in his stool.  Has felt nauseous, however no vomiting.  No dysuria or hematuria.  Suspect likely viral gastroenteritis at this point given his relatively reassuring examination, and lack of any focal laboratory findings.  Plan will be for imaging of his chest, abdomen pelvis to ensure that there is no mass, lesion, obstruction, or other intra-abdominal pathology, and then likely discharge home.   1822 CT Abdomen Pelvis w Contrast  CT abdomen pelvis did not show any evidence of any abnormality.  No hydronephrosis, no obstruction, normal appendix.   1906 Chest x-ray here is negative, patient seen and reevaluated, updated on negative imaging, negative laboratory studies.  No clear etiology for patient's abdominal pain.  Suspect is a viral gastroenteritis given the diarrhea.  Patient reports that he has a child sick at home with similar symptoms, suspect this is likely viral.  Plan for discharge at this time, we will send him home with some Zofran for symptomatic management.  Plan for discharge at this time.     Medical Decision Making    History:    Supplemental history from: Documented in HPI, if applicable    External Record(s) reviewed: Documented in HPI, if applicable.    Work Up:    Chart documentation includes differential considered and any EKGs or imaging independently interpreted by provider.    In additional to work up documented, I considered the following work up: See chart documentation, if applicable.    External consultation:    Discussion of management with another provider: See chart documentation, if applicable    Complicating factors:    Care impacted by chronic illness: N/A    Care affected by social determinants of health: N/A    Disposition considerations: Discharge. I prescribed additional prescription strength medication(s) as charted. N/A.       At the conclusion  of the encounter I discussed the results of all of the tests and the disposition. The questions were answered. The patient or family acknowledged understanding and was agreeable with the care plan.     0 minutes of critical care time     MEDICATIONS GIVEN IN THE EMERGENCY:  Medications   ondansetron (ZOFRAN) injection 4 mg (has no administration in time range)   iopamidol (ISOVUE-370) solution 100 mL (100 mLs Intravenous Given 12/30/22 1803)       NEW PRESCRIPTIONS STARTED AT TODAY'S ER VISIT  New Prescriptions    ONDANSETRON (ZOFRAN ODT) 4 MG ODT TAB    Take 1 tablet (4 mg) by mouth every 8 hours as needed for nausea     =================================================================    HPI    Patient information was obtained from: Patient    Use of : N/A    Ariella Sheridan is a 37 year old male with no significant past medical history who presents to this ED for evaluation of left lower quadrant abdominal pain, nausea, fatigue.  Patient notes that he noticed symptoms after coming to the house after working on a snow[employer]ake outside for 6 or 7 hours last Wednesday.  He has had symptoms since then.  Notes that he has not had much appetite.  Persistent left lower quadrant crampy abdominal pain.  No history of any GI issue.  Denies any blood in the stool.  Has not had any vomiting but has had nausea.  Has not been taking any medications at home.  Is not on any daily medications.  No history of GI problems, or other abnormality.  No chest pain or shortness of breath.  No fevers at home.  Notes that he has had watery stool since his symptoms began.  Normally just feels fatigued, really denies any shortness of breath.  Was working outside, the engine was running, however he was in open air.  No cough, cold, fever symptoms.       REVIEW OF SYSTEMS   Review of Systems   Constitutional: Negative for chills, fatigue and fever.   Respiratory: Positive for shortness of breath. Negative for cough and chest tightness.     Cardiovascular: Negative for chest pain.   Gastrointestinal: Positive for abdominal pain, diarrhea and nausea. Negative for abdominal distention and vomiting.   Genitourinary: Negative for dysuria, frequency and hematuria.   Neurological: Positive for weakness. Negative for dizziness, light-headedness, numbness and headaches.   All other systems reviewed and are negative.       PAST MEDICAL HISTORY:  No past medical history on file.    PAST SURGICAL HISTORY:  No past surgical history on file.        CURRENT MEDICATIONS:    ondansetron (ZOFRAN ODT) 4 MG ODT tab  fluticasone propionate (FLONASE) 50 mcg/actuation nasal spray         ALLERGIES:  No Known Allergies    FAMILY HISTORY:  Family History   Problem Relation Age of Onset     Diabetes Mother      Hypertension Mother      Rheumatoid Arthritis Mother      Diabetes Father      Hypertension Father      Kidney failure Father      Diabetes Paternal Grandmother        SOCIAL HISTORY:   Social History     Socioeconomic History     Marital status:    Tobacco Use     Smoking status: Never     Smokeless tobacco: Never       VITALS:  BP (!) 157/104   Pulse 79   Temp 98.7  F (37.1  C)   Resp 18   Wt 77.6 kg (171 lb)   SpO2 97%   BMI 23.35 kg/m      PHYSICAL EXAM    Physical Exam  Vitals and nursing note reviewed.   Constitutional:       General: He is not in acute distress.     Appearance: Normal appearance. He is normal weight. He is not toxic-appearing or diaphoretic.   HENT:      Right Ear: External ear normal.      Left Ear: External ear normal.   Eyes:      Conjunctiva/sclera: Conjunctivae normal.   Cardiovascular:      Rate and Rhythm: Normal rate and regular rhythm.      Heart sounds: Normal heart sounds.   Pulmonary:      Effort: Pulmonary effort is normal.   Abdominal:      General: Abdomen is flat.      Palpations: Abdomen is soft.      Tenderness: There is abdominal tenderness in the left lower quadrant. There is no right CVA tenderness, left CVA  tenderness, guarding or rebound.   Skin:     Coloration: Skin is not jaundiced.      Findings: No erythema or rash.   Neurological:      General: No focal deficit present.      Mental Status: He is alert. Mental status is at baseline.         LAB:  All pertinent labs reviewed and interpreted.  Labs Ordered and Resulted from Time of ED Arrival to Time of ED Departure   BASIC METABOLIC PANEL - Abnormal       Result Value    Sodium 141      Potassium 4.0      Chloride 107      Carbon Dioxide (CO2) 25      Anion Gap 9      Urea Nitrogen 9.0      Creatinine 0.86      Calcium 8.4 (*)     Glucose 107 (*)     GFR Estimate >90     CBC WITH PLATELETS - Normal    WBC Count 4.7      RBC Count 5.20      Hemoglobin 15.3      Hematocrit 44.8      MCV 86      MCH 29.4      MCHC 34.2      RDW 12.0      Platelet Count 224     HEPATIC FUNCTION PANEL - Normal    Protein Total 6.7      Albumin 4.1      Bilirubin Total 1.1      Alkaline Phosphatase 70      AST 25      ALT 32      Bilirubin Direct <0.20     LIPASE - Normal    Lipase 45     ROUTINE UA WITH MICROSCOPIC REFLEX TO CULTURE - Normal    Color Urine Colorless      Appearance Urine Clear      Glucose Urine Negative      Bilirubin Urine Negative      Ketones Urine Negative      Specific Gravity Urine 1.011      Blood Urine Negative      pH Urine 6.5      Protein Albumin Urine Negative      Urobilinogen Urine <2.0      Nitrite Urine Negative      Leukocyte Esterase Urine Negative      RBC Urine 1      WBC Urine <1     TROPONIN T, HIGH SENSITIVITY - Normal    Troponin T, High Sensitivity 13         RADIOLOGY:  Reviewed all pertinent imaging. Please see official radiology report.  Chest XR,  PA & LAT   Final Result   IMPRESSION: Negative chest.      CT Abdomen Pelvis w Contrast   Final Result   IMPRESSION:    1.  No visualized explanation for patient's abdominal pain.          EKG:    Performed at: 12/30/2022 1623    Impression: Sinus Rhythm, incomplete RBBB    Rate: 74  Rhythm: Sinus    Axis: 74 32 29  MN Interval: 170  QRS Interval: 102  QTc Interval: 437  ST Changes: No ST elevation or depression   Comparison: No previous for comparison    I have reviewed and interpreted the EKG(s) documented above along with Dr. Dhillon ED MD.    PROCEDURES:   None.     Milind Alva PA-C  Emergency Medicine  [facility] EMERGENCY DEPARTMENT  [facility] [facility] [id number] [phone]  Dept: [phone]     Milind Alva PA-C  12/30/22 190

## 2022-12-31 NOTE — DISCHARGE INSTRUCTIONS
You are seen here in the emergency department for evaluation of diffuse lower abdominal pain.  Your CT scan here is unremarkable, your labs here do not show any acute abnormalities.  I suspect you may have a little bit of a viral illness, likely contributing to your symptoms.  We will send you home with some Zofran.  Continue to monitor your symptoms, ibuprofen or Tylenol at home.  I suspect they will likely resolve over the next 3 to 5 days.  Return to the emergency department if develop any new or worsening abdominal pain, the pain starts moving locations, or starts changing in intensity. Otherwise please follow up with your primary care doctor.     For pain or fever you may use:  -Tylenol 650 mg every 6 hours.  Max 4000 mg in 24 hours  Do not use thismedication with alcohol as it can cause liver problems.  -Ibuprofen 600 mg every 6 hours.  Max 3500 mg in 24 hours  Do not take this medication if you have a history of a GI bleed or have kidney problems.  You may use both of these medications at the same time or you can alternate them every 3 hours.  For example, Tylenol at 6 AM, ibuprofen at 9 AM, Tylenol at noon, etc.

## 2023-01-10 ENCOUNTER — OFFICE VISIT (OUTPATIENT)
Dept: FAMILY MEDICINE | Facility: CLINIC | Age: 38
End: 2023-01-10
Payer: COMMERCIAL

## 2023-01-10 VITALS
WEIGHT: 174.4 LBS | OXYGEN SATURATION: 98 % | SYSTOLIC BLOOD PRESSURE: 128 MMHG | TEMPERATURE: 97.9 F | RESPIRATION RATE: 16 BRPM | HEART RATE: 79 BPM | DIASTOLIC BLOOD PRESSURE: 88 MMHG | BODY MASS INDEX: 23.62 KG/M2 | HEIGHT: 72 IN

## 2023-01-10 DIAGNOSIS — R10.12 LEFT UPPER QUADRANT PAIN: ICD-10-CM

## 2023-01-10 DIAGNOSIS — J30.89 SEASONAL ALLERGIC RHINITIS DUE TO OTHER ALLERGIC TRIGGER: ICD-10-CM

## 2023-01-10 LAB
CRP SERPL-MCNC: <3 MG/L
ERYTHROCYTE [SEDIMENTATION RATE] IN BLOOD BY WESTERGREN METHOD: 7 MM/HR (ref 0–15)

## 2023-01-10 PROCEDURE — 36415 COLL VENOUS BLD VENIPUNCTURE: CPT | Performed by: FAMILY MEDICINE

## 2023-01-10 PROCEDURE — 86364 TISS TRNSGLTMNASE EA IG CLAS: CPT | Performed by: FAMILY MEDICINE

## 2023-01-10 PROCEDURE — 85652 RBC SED RATE AUTOMATED: CPT | Performed by: FAMILY MEDICINE

## 2023-01-10 PROCEDURE — 86140 C-REACTIVE PROTEIN: CPT | Performed by: FAMILY MEDICINE

## 2023-01-10 PROCEDURE — 99214 OFFICE O/P EST MOD 30 MIN: CPT | Performed by: FAMILY MEDICINE

## 2023-01-10 RX ORDER — FLUTICASONE PROPIONATE 50 MCG
2 SPRAY, SUSPENSION (ML) NASAL DAILY
Qty: 18.2 ML | Refills: 11 | Status: SHIPPED | OUTPATIENT
Start: 2023-01-10

## 2023-01-10 RX ORDER — SUCRALFATE 1 G/1
1 TABLET ORAL 4 TIMES DAILY
Qty: 60 TABLET | Refills: 0 | Status: SHIPPED | OUTPATIENT
Start: 2023-01-10

## 2023-01-10 ASSESSMENT — PAIN SCALES - GENERAL: PAINLEVEL: SEVERE PAIN (7)

## 2023-01-10 NOTE — PATIENT INSTRUCTIONS
We will check for: H Pylori (bacteria that causes ulcers), Inflammatory Bowel Disease (general blood tests, but sometimes not detected in blood work), Celiac's Disease.   Please do the STOOL tests before taking the omeprazole.  The omeprazole kicks in in several days, if it does not feel better at all, please message me.     If these don't reveal anything and you continue to have pain, the next steps are to do an ENDOSCOPY both of your esophagus and stomach, as well as you colon.

## 2023-01-10 NOTE — PROGRESS NOTES
Assessment & Plan     Left upper quadrant pain  Acute, worsening. Patient with worsening LUQ and LLQ pain that is sharp in nature. Does have some associated GI changes, but no weight loss, other red flag symptoms. Broad differential, including h pylori gastritis versus gastritis versus inflammatory bowel disease. Will do work up as below and symptomatic management. If symptoms not controlled after 30 days, double dose, possibly consider EGD/Colonoscopy.   - Helicobacter pylori Antigen Stool  - Tissue transglutaminase liliana IgA and IgG  - ESR: Erythrocyte sedimentation rate  - CRP, inflammation  - omeprazole (PRILOSEC) 20 MG DR capsule  Dispense: 30 capsule; Refill: 1  - sucralfate (CARAFATE) 1 GM tablet  Dispense: 60 tablet; Refill: 0  - Helicobacter pylori Antigen Stool  - Tissue transglutaminase liliana IgA and IgG  - ESR: Erythrocyte sedimentation rate  - CRP, inflammation  - Calprotectin Feces  - Calprotectin Feces    Seasonal allergic rhinitis due to other allergic trigger  Chronic, stable. Refill ordered.   - fluticasone (FLONASE) 50 MCG/ACT nasal spray  Dispense: 18.2 mL; Refill: 11      Ordering of each unique test  Prescription drug management   MED REC REQUIRED  Post Medication Reconciliation Status:  Patient was not discharged from an inpatient facility or TCU    See Patient Instructions    Return in about 4 weeks (around 2/7/2023) for Follow up, with me.    Tonya Hurd, [facility]   Ariella is a 37 year old, presenting for the following health issues:  ER F/U and Refill Request (flonase)      HPI     ED/UC Followup:    Facility:  [facility]  Date of visit: 12/30/2022  Reason for visit: abdominal pain  Current Status: Pt states still having very sharp pain throughout the day, 7/10 for pain, does not feel bloated, sharp pain from middle to left side, comes and goes, almost always there though, bad enough where it wakes me up at night too    Concern - Abdominal  Pain  Onset: No associated episode  Description: Pain is more left sided, upper abdominal and there most of the time. Denies bloating, mostly a sharp pain.   Intensity: Stays the same  Progression of Symptoms:  worsening  Accompanying Signs & Symptoms: no heartburn, bloating, taste in mouth. Patient does endorse more loose stool, vomiting, blood in the stool, melena. Denies weight loss. Appetite increased but pain does not change with eating.   Previous history of similar problem: Denies  Precipitating factors:        Worsened by: Not eating  Alleviating factors:        Improved by:   Therapies tried and outcome: Tried tums, pepcid, and those do not help.    No urinary symptoms. No recent travel. No history of this occurring before.     Review of Systems   Constitutional, HEENT, cardiovascular, pulmonary, gi and gu systems are negative, except as otherwise noted.      Objective    /88 (BP Location: Right arm, Patient Position: Sitting, Cuff Size: Adult Regular)   Pulse 79   Temp 97.9  F (36.6  C) (Oral)   Resp 16   Ht 1.829 m (6')   Wt 79.1 kg (174 lb 6.4 oz)   SpO2 98%   BMI 23.65 kg/m    Body mass index is 23.65 kg/m .  Physical Exam   GENERAL: healthy, alert and no distress  EYES: Eyes grossly normal to inspection, PERRL and conjunctivae and sclerae normal  HENT: ear canals and TM's normal, nose and mouth without ulcers or lesions  NECK: no adenopathy, no asymmetry, masses, or scars and thyroid normal to palpation  RESP: lungs clear to auscultation - no rales, rhonchi or wheezes  CV: regular rate and rhythm, normal S1 S2, no S3 or S4, no murmur, click or rub, no peripheral edema and peripheral pulses strong  ABDOMEN: tenderness epigastric and LLQ, no organomegaly or masses, bowel sounds normal, Not palpable mass, but does have pinpoint tenderness  MS: no gross musculoskeletal defects noted, no edema  SKIN: no suspicious lesions or rashes    Results for orders placed or performed in visit on 01/10/23  (from the past 24 hour(s))   ESR: Erythrocyte sedimentation rate   Result Value Ref Range    Erythrocyte Sedimentation Rate 7 0 - 15 mm/hr

## 2023-01-11 NOTE — RESULT ENCOUNTER NOTE
Dear Ariella,     Here are your recent results which are within the expected range. Please continue with your current plan of care and let us know if you have any questions or concerns. It is unlikely that these symptoms are from an inflammatory disease or acute (meaning requiring urgent action) infection.     Regards,  Tonya Hurd, DO

## 2023-01-14 LAB
TTG IGA SER-ACNC: 0.9 U/ML
TTG IGG SER-ACNC: 0.7 U/ML

## 2023-01-16 NOTE — RESULT ENCOUNTER NOTE
Dear Ariella,     Here are your recent results which are within the expected range. It appears your symptoms are not consistent with celiac disease. We should still continue our work up as discussed. Please continue with your current plan of care and let us know if you have any questions or concerns.    Regards,  Tonya Hurd, DO

## 2023-05-08 ENCOUNTER — HEALTH MAINTENANCE LETTER (OUTPATIENT)
Age: 38
End: 2023-05-08

## 2024-01-25 ENCOUNTER — OFFICE VISIT (OUTPATIENT)
Dept: FAMILY MEDICINE | Facility: CLINIC | Age: 39
End: 2024-01-25
Payer: COMMERCIAL

## 2024-01-25 VITALS
HEART RATE: 77 BPM | HEIGHT: 72 IN | SYSTOLIC BLOOD PRESSURE: 122 MMHG | DIASTOLIC BLOOD PRESSURE: 84 MMHG | WEIGHT: 182 LBS | OXYGEN SATURATION: 98 % | BODY MASS INDEX: 24.65 KG/M2

## 2024-01-25 DIAGNOSIS — Z00.00 ROUTINE GENERAL MEDICAL EXAMINATION AT A HEALTH CARE FACILITY: ICD-10-CM

## 2024-01-25 DIAGNOSIS — Z13.1 SCREENING FOR DIABETES MELLITUS: ICD-10-CM

## 2024-01-25 DIAGNOSIS — Z11.59 NEED FOR HEPATITIS C SCREENING TEST: ICD-10-CM

## 2024-01-25 DIAGNOSIS — Z13.220 LIPID SCREENING: ICD-10-CM

## 2024-01-25 DIAGNOSIS — Z11.4 SCREENING FOR HIV (HUMAN IMMUNODEFICIENCY VIRUS): Primary | ICD-10-CM

## 2024-01-25 PROCEDURE — 99395 PREV VISIT EST AGE 18-39: CPT | Performed by: PHYSICIAN ASSISTANT

## 2024-01-25 ASSESSMENT — ENCOUNTER SYMPTOMS
PARESTHESIAS: 0
PALPITATIONS: 0
NERVOUS/ANXIOUS: 0
SHORTNESS OF BREATH: 0
EYE PAIN: 0
FREQUENCY: 0
HEMATOCHEZIA: 0
CONSTIPATION: 0
SORE THROAT: 0
DYSURIA: 0
HEMATURIA: 0
HEARTBURN: 0
JOINT SWELLING: 0
MYALGIAS: 0
WEAKNESS: 0
COUGH: 0
NAUSEA: 0
DIARRHEA: 0
ABDOMINAL PAIN: 0
CHILLS: 0
ARTHRALGIAS: 0
FEVER: 0
HEADACHES: 0
DIZZINESS: 0

## 2024-01-25 NOTE — PROGRESS NOTES
Preventive Care Visit  [facility] MUSTAPHA Tejada PA-C, Family Medicine  Jan 25, 2024       SUBJECTIVE:   Ariella is a 38 year old, presenting for the following:  Physical and Forms        1/25/2024     2:50 PM   Additional Questions   Roomed by Jarek   Accompanied by self     Healthy Habits:     Getting at least 3 servings of Calcium per day:  NO    Bi-annual eye exam:  NO    Dental care twice a year:  NO    Sleep apnea or symptoms of sleep apnea:  None    Diet:  Regular (no restrictions)    Frequency of exercise:  1 day/week    Duration of exercise:  45-60 minutes    Taking medications regularly:  Not Applicable    Medication side effects:  Not applicable    Additional concerns today:  No  Answers submitted by the patient for this visit:  Annual Preventive Visit (Submitted on 1/25/2024)  Chief Complaint: Annual Exam:  Blood in stool: No  heartburn: No  peripheral edema: No  mood changes: No  Skin sensation changes: No  impotence: No      Today's PHQ-2 Score:       1/25/2024     2:10 PM   PHQ-2 ( 1999 [employer])   Q1: Little interest or pleasure in doing things 0   Q2: Feeling down, depressed or hopeless 0   PHQ-2 Score 0   Q1: Little interest or pleasure in doing things Not at all   Q2: Feeling down, depressed or hopeless Not at all   PHQ-2 Score 0                     Social History     Tobacco Use    Smoking status: Never     Passive exposure: Never    Smokeless tobacco: Never   Substance Use Topics    Alcohol use: Not Currently             1/25/2024     2:09 PM   Alcohol Use   Prescreen: >3 drinks/day or >7 drinks/week? No     Reviewed orders with patient.  Reviewed health maintenance and updated orders accordingly - Yes  Lab work is in process  Labs reviewed in EPIC    Breast Cancer Screening:         Reviewed and updated as needed this visit by clinical staff   Tobacco  Allergies  Meds  Problems  Med Hx  Surg Hx  Fam Hx          Reviewed and updated as needed this visit by Provider    Tobacco  Allergies  Meds  Problems  Med Hx  Surg Hx  Fam Hx            Review of Systems   Constitutional:  Negative for chills and fever.   HENT:  Negative for congestion, ear pain, hearing loss and sore throat.    Eyes:  Negative for pain and visual disturbance.   Respiratory:  Negative for cough and shortness of breath.    Cardiovascular:  Negative for chest pain and palpitations.   Gastrointestinal:  Negative for abdominal pain, constipation, diarrhea and nausea.   Genitourinary:  Negative for dysuria, frequency, genital sores, hematuria, penile discharge and urgency.   Musculoskeletal:  Negative for arthralgias, joint swelling and myalgias.   Skin:  Negative for rash.   Neurological:  Negative for dizziness, weakness and headaches.   Psychiatric/Behavioral:  The patient is not nervous/anxious.          OBJECTIVE:   /84 (BP Location: Left arm, Patient Position: Sitting, Cuff Size: Adult Large)   Pulse 77   Ht 1.829 m (6')   Wt 82.6 kg (182 lb)   SpO2 98%   BMI 24.68 kg/m     Estimated body mass index is 24.68 kg/m  as calculated from the following:    Height as of this encounter: 1.829 m (6').    Weight as of this encounter: 82.6 kg (182 lb).  Physical Exam  GENERAL: alert and no distress  EYES: Eyes grossly normal to inspection, PERRL and conjunctivae and sclerae normal  HENT: ear canals and TM's normal, nose and mouth without ulcers or lesions  NECK: no adenopathy, no asymmetry, masses, or scars  RESP: lungs clear to auscultation - no rales, rhonchi or wheezes  CV: regular rate and rhythm, normal S1 S2, no S3 or S4, no murmur, click or rub, no peripheral edema  ABDOMEN: soft, nontender, no hepatosplenomegaly, no masses and bowel sounds normal  MS: no gross musculoskeletal defects noted, no edema  SKIN: no suspicious lesions or rashes  NEURO: Normal strength and tone, mentation intact and speech normal  PSYCH: mentation appears normal, affect normal/bright  LYMPH: no cervical, supraclavicular,  axillary, or inguinal adenopathy    Diagnostic Test Results:  Labs reviewed in Epic    ASSESSMENT/PLAN:   Screening for HIV (human immunodeficiency virus)  - HIV Antigen Antibody Combo    Need for hepatitis C screening test  - Hepatitis C Screen Reflex to HCV RNA Quant and Genotype    Routine general medical examination at a health care facility    Screening for diabetes mellitus  - Hemoglobin A1c    Lipid screening  - Lipid Profile (Chol, Trig, HDL, LDL calc)    Paperwork completed for opening a home     Counseling  Reviewed preventive health counseling, as reflected in patient instructions        He reports that he has never smoked. He has never been exposed to tobacco smoke. He has never used smokeless tobacco.          Signed Electronically by: Pao Tejada PA-C

## 2024-01-29 ENCOUNTER — LAB (OUTPATIENT)
Dept: LAB | Facility: CLINIC | Age: 39
End: 2024-01-29
Payer: COMMERCIAL

## 2024-01-29 DIAGNOSIS — Z11.4 SCREENING FOR HIV (HUMAN IMMUNODEFICIENCY VIRUS): ICD-10-CM

## 2024-01-29 DIAGNOSIS — Z13.1 SCREENING FOR DIABETES MELLITUS: ICD-10-CM

## 2024-01-29 DIAGNOSIS — Z11.59 NEED FOR HEPATITIS C SCREENING TEST: ICD-10-CM

## 2024-01-29 DIAGNOSIS — Z13.220 LIPID SCREENING: ICD-10-CM

## 2024-01-29 LAB
CHOLEST SERPL-MCNC: 164 MG/DL
FASTING STATUS PATIENT QL REPORTED: YES
HBA1C MFR BLD: 5.2 % (ref 0–5.6)
HDLC SERPL-MCNC: 46 MG/DL
HIV 1+2 AB+HIV1 P24 AG SERPL QL IA: NONREACTIVE
LDLC SERPL CALC-MCNC: 98 MG/DL
NONHDLC SERPL-MCNC: 118 MG/DL
TRIGL SERPL-MCNC: 102 MG/DL

## 2024-01-29 PROCEDURE — 36415 COLL VENOUS BLD VENIPUNCTURE: CPT

## 2024-01-29 PROCEDURE — 87389 HIV-1 AG W/HIV-1&-2 AB AG IA: CPT

## 2024-01-29 PROCEDURE — 80061 LIPID PANEL: CPT

## 2024-01-29 PROCEDURE — 83036 HEMOGLOBIN GLYCOSYLATED A1C: CPT

## 2024-01-29 PROCEDURE — 86803 HEPATITIS C AB TEST: CPT

## 2024-01-30 LAB — HCV AB SERPL QL IA: NONREACTIVE

## 2024-02-28 ENCOUNTER — APPOINTMENT (OUTPATIENT)
Dept: LAB | Facility: CLINIC | Age: 39
End: 2024-02-28
Attending: FAMILY MEDICINE
Payer: COMMERCIAL

## 2024-02-28 ENCOUNTER — E-VISIT (OUTPATIENT)
Dept: FAMILY MEDICINE | Facility: CLINIC | Age: 39
End: 2024-02-28
Payer: COMMERCIAL

## 2024-02-28 DIAGNOSIS — U07.1 INFECTION DUE TO 2019 NOVEL CORONAVIRUS: Primary | ICD-10-CM

## 2024-02-28 PROCEDURE — 87635 SARS-COV-2 COVID-19 AMP PRB: CPT | Performed by: FAMILY MEDICINE

## 2024-02-29 ENCOUNTER — TELEPHONE (OUTPATIENT)
Dept: NURSING | Facility: CLINIC | Age: 39
End: 2024-02-29
Payer: COMMERCIAL

## 2024-02-29 LAB — SARS-COV-2 RNA RESP QL NAA+PROBE: POSITIVE

## 2024-02-29 NOTE — TELEPHONE ENCOUNTER
Coronavirus (COVID-19) Notification    Caller Name (Patient, parent, daughter/son, grandparent, etc)  Patient    Reason for call  Notify of Positive Coronavirus (COVID-19) lab results, assess symptoms,  review [facility] recommendations    Lab Result    Lab test:  2019-nCoV rRt-PCR or SARS-CoV-2 PCR    Oropharyngeal AND/OR nasopharyngeal swabs is POSITIVE for 2019-nCoV RNA/SARS-COV-2 PCR (COVID-19 virus)    Gather patient reported symptoms   Assessment   Current Symptoms at time of phone call, reported by patient: (if no symptoms, document: No symptoms] Cough, Diarrhea, Runny Nose, Chills, Body Aches   Date of symptom(s) onset (if applicable) 02/26/24     If at time of call, Patients symptoms have worsened, the Patient should contact 911 or have someone drive them to Emergency Dept promptly:    If Patient calling 911, inform 911 personal that you have tested positive for the Coronavirus (COVID-19).  Place mask on and await 911 to arrive.  If Emergency Dept, If possible, please have another adult drive you to the Emergency Dept but you need to wear mask when in contact with other people.      Treatment Options:   Is patient interested in discussing COVID treatment? No.        Review information with Patient    Your result was positive. This means you have COVID-19 (coronavirus).    How can I protect others?    These guidelines are for isolating before returning to work, school or .    If you DO have symptoms  Stay home and away from others   For at least 5 days after your symptoms started, AND  You are fever free for 24 hours (with no medicine that reduces fever), AND  Your other symptoms are better  Wear a mask for 10 full days anytime you are around others    If you DON'T have symptoms  Stay home and away from others for at least 5 days after your positive test  Wear a mask for 10 full days anytime you are around others    There may be different guidelines for healthcare facilities.  Please check with  the specific sites before arriving.    If you have been told by a doctor that you were severely ill with COVID-19 or are immunocompromised, you should isolate for at least 10 days.    You should not go back to work until you meet the guidelines above for ending your home isolation. You don't need to be retested for COVID-19 before going back to work--studies show that you won't spread the virus if it's been at least 10 days since your symptoms started (or 20 days, if you have a weak immune system).    Employers, schools, and daycares: This is an official notice for this person's medical guidelines for returning in-person.  They must meet the above guidelines before going back to work, school or  in person.    You will receive a positive COVID-19 letter via [employer] or the mail soon with additional self-care information.    Would you like me to review some of that information with you now?  Yes    How can I take care of myself?    Get lots of rest. Drink extra fluids (unless a doctor has told you not to).    Take Tylenol (acetaminophen) for fever or pain. If you have liver or kidney problems, ask your family doctor if it's okay to take Tylenol.     Take either:   650 mg (two 325 mg pills) every 4 to 6 hours, or   1,000 mg (two 500 mg pills) every 8 hours as needed.   Note: Do not take more than 3,000 mg in one day. Acetaminophen is found in many medicines (both prescribed and over-the-counter medicines). Read all labels to be sure you don't take too much.    For children, check the Tylenol bottle for the right dose (based on their age or weight).    If you have other health problems (like cancer, heart failure, an organ transplant or severe kidney disease): Call your specialty clinic if you don't feel better in the next 2 days.    Know when to call 911: Emergency warning signs include:  Trouble breathing or shortness of breath  Pain or pressure in the chest that doesn't go away  Feeling confused like you  haven't felt before, or not being able to wake up  Bluish-colored lips or face      If you were tested for an upcoming procedure, please contact your provider for next steps.    SATHISH MARTINEZ

## 2024-12-26 ENCOUNTER — PATIENT OUTREACH (OUTPATIENT)
Dept: CARE COORDINATION | Facility: CLINIC | Age: 39
End: 2024-12-26
Payer: COMMERCIAL

## 2025-01-09 ENCOUNTER — PATIENT OUTREACH (OUTPATIENT)
Dept: CARE COORDINATION | Facility: CLINIC | Age: 40
End: 2025-01-09
Payer: COMMERCIAL

## 2025-03-15 ENCOUNTER — HEALTH MAINTENANCE LETTER (OUTPATIENT)
Age: 40
End: 2025-03-15